# Patient Record
Sex: FEMALE | Race: WHITE | NOT HISPANIC OR LATINO | URBAN - METROPOLITAN AREA
[De-identification: names, ages, dates, MRNs, and addresses within clinical notes are randomized per-mention and may not be internally consistent; named-entity substitution may affect disease eponyms.]

---

## 2017-11-13 ENCOUNTER — OFFICE VISIT - HEALTHEAST (OUTPATIENT)
Dept: UROLOGY | Facility: CLINIC | Age: 47
End: 2017-11-13

## 2017-11-13 DIAGNOSIS — N20.9 URINARY TRACT STONES: ICD-10-CM

## 2017-11-13 DIAGNOSIS — N20.1 CALCULUS OF URETER: ICD-10-CM

## 2017-11-13 RX ORDER — RIZATRIPTAN BENZOATE 10 MG/1
10 TABLET ORAL
Status: SHIPPED | COMMUNITY
Start: 2017-11-13

## 2017-11-16 ENCOUNTER — HOSPITAL ENCOUNTER (OUTPATIENT)
Dept: ADMINISTRATIVE | Age: 47
Discharge: HOME | End: 2017-11-16

## 2017-11-20 ENCOUNTER — AMBULATORY - HEALTHEAST (OUTPATIENT)
Dept: LAB | Facility: HOSPITAL | Age: 47
End: 2017-11-20

## 2017-11-20 ENCOUNTER — COMMUNICATION - HEALTHEAST (OUTPATIENT)
Dept: UROLOGY | Facility: CLINIC | Age: 47
End: 2017-11-20

## 2017-11-20 DIAGNOSIS — N20.9 URINARY TRACT STONES: ICD-10-CM

## 2017-11-21 LAB
1ST CONSTITUENT:: NORMAL
SOURCE: NORMAL

## 2017-11-27 ENCOUNTER — OFFICE VISIT - HEALTHEAST (OUTPATIENT)
Dept: UROLOGY | Facility: CLINIC | Age: 47
End: 2017-11-27

## 2017-11-27 DIAGNOSIS — N20.9 URINARY TRACT STONES: ICD-10-CM

## 2017-11-27 DIAGNOSIS — N20.1 CALCULUS OF URETER: ICD-10-CM

## 2017-11-27 RX ORDER — MAGNESIUM OXIDE 400 MG/1
400 TABLET ORAL DAILY
Status: SHIPPED | COMMUNITY
Start: 2017-11-27

## 2021-06-14 NOTE — PROGRESS NOTES
Assessment/Plan:        Diagnoses and all orders for this visit:    Calculus of ureter  -     Patient Stated Goal: Pass my stone    Urinary tract stones  -     Urinalysis Macroscopic    Other orders  -     rizatriptan (MAXALT) 10 MG tablet; Take 10 mg by mouth.      Stone Management Plan  [facility] 11/13/2017   Urinary Tract Infection No suspicion of infection   Renal Colic Well controlled symptoms   Renal Failure No suspicion of renal failure   Current CT date 11/10/2017   Right sided stones? Yes   R Number of ureteral stones 1   R GSD of ureteral stones 2   R Location of ureteral stone Distal   R Number of kidney stones  No renal stones   R Hydronephrosis Mild   R Stone Event New event   Diagnosis date 11/10/2017   Initial location of primary symptomatic stone Distal   Initial GSD of primary symptomatic stone 2   R MET status Initiation   R Current Plan MET   MET 2 week F/U   Left sided stones? No   L Stone Event No current event             Subjective:      HPI  Ms. Shena Mondragon is a 47 y.o.  female presenting to the [facility] [place] for a new problem.    She is a remotely recurrent unidentified composition stone former who has not required stone clearance procedures. She has not previously participated in stone risk evaluation. She has no identified modifiable stone risk factors. She has no identified non-modifiable stone risk factors.    Primary symptom at presentation was sudden onset  right flank pain Significant associated symptoms at presentation included:  nausea, vomiting, urinary frequency and dysuria. Significant current symptoms include:  right flank pain, urinary frequency and dysuria.. Pertinent negative current symptoms include:  fever and chills. She presented to ED where diagnosis and initial care was provided. She and her  have recently moved to the [facility] from [place] for his company. They speak limited English.    CT scan is personally reviewed  and demonstrates a 2 mm right extreme UVJ stone with mild hydronephrosis..    Significant labs from presentation include no hematuria, mild pyuria, normal WBC, normal creatinine and normal potassium.    PLAN  Will proceed with medical expulsive therapy. Risks and benefits were detailed of medical expulsive therapy including probability of stone passage, recurrent renal colic, and requirement of emergency medical and/or surgical care and further imaging. Patient verbalized understanding. Patient agrees with plan as discussed. She will return in 2 weeks without low dose CT scan.    Over the counter symptom control medications of ibuprofen and Dramamine were recommended.      ROS   Review of Systems  A 12 point comprehensive review of systems is negative except for HPI    Past Medical History:   Diagnosis Date     Kidney stone      Migraine        Past Surgical History:   Procedure Laterality Date      SECTION, CLASSIC       VEIN SURGERY         Current Outpatient Prescriptions   Medication Sig Dispense Refill     ibuprofen (ADVIL,MOTRIN) 200 MG tablet Take 3 tablets (600 mg total) by mouth every 6 (six) hours as needed for pain. 30 tablet 0     ondansetron (ZOFRAN ODT) 4 MG disintegrating tablet Take 1 tablet (4 mg total) by mouth every 8 (eight) hours as needed for nausea. 20 tablet 0     oxyCODONE-acetaminophen (PERCOCET) 5-325 mg per tablet Take 1-2 tablets by mouth every 6 (six) hours as needed for pain. 20 tablet 0     rizatriptan (MAXALT) 10 MG tablet Take 10 mg by mouth.       No current facility-administered medications for this visit.        No Known Allergies    Social History     Social History     Marital status:      Spouse name: N/A     Number of children: N/A     Years of education: N/A     Occupational History     Retired      Social History Main Topics     Smoking status: Never Smoker     Smokeless tobacco: Never Used     Alcohol use Yes      Comment: occas     Drug use: Not on file      Sexual activity: Not on file     Other Topics Concern     Not on file     Social History Narrative     No narrative on file       Family History   Problem Relation Age of Onset     Urolithiasis Mother      Heart disease Mother      Diabetes Mother      Diabetes Maternal Grandmother      Clotting disorder Neg Hx      Gout Neg Hx      Cancer Neg Hx        Objective:      Physical Exam  Vitals:    11/13/17 1303   BP: 132/59   Pulse: 69   Temp: 98.1  F (36.7  C)     General - well developed, well nourished, appropriate for age. Appears fatigued and moderately uncomfortable   Heart - regular rate and rhythm, no murmur  Respiratory - normal effort, clear to auscultation, good air entry without adventitious noises  Abdomen - slender soft, non-tender, no hepatosplenomegaly, no masses.   - right flank mild tenderness, no suprapubic tenderness, kidney and bladder non-palpable  MSK - normal spinal curvature. no spinal tenderness. normal gait. muscular strength intact.  Neurology - cranial nerves II-XII grossly intact, normal sensation, no unsteadiness  Skin - intact, no bruising, no gouty tophi  Psych - oriented to time, place, and person, normal mood and affect.        Labs  Urinalysis POC (Office):  Nitrite, UA   Date Value Ref Range Status   11/13/2017 Negative Negative Final   11/10/2017 Negative Negative Final       Lab Urinalysis:  Blood, UA   Date Value Ref Range Status   11/13/2017 Trace (!) Negative Final   11/10/2017 Negative Negative Final     Nitrite, UA   Date Value Ref Range Status   11/13/2017 Negative Negative Final   11/10/2017 Negative Negative Final     Leukocytes, UA   Date Value Ref Range Status   11/13/2017 Negative Negative Final   11/10/2017 Trace (!) Negative Final     pH, UA   Date Value Ref Range Status   11/13/2017 7.0 5.0 - 8.0 Final   11/10/2017 8.0 4.5 - 8.0 Final    and Acute Labs   CBC   WBC   Date Value Ref Range Status   11/10/2017 7.2 4.0 - 11.0 thou/uL Final     Hemoglobin   Date Value  Ref Range Status   11/10/2017 13.0 12.0 - 16.0 g/dL Final     Platelets   Date Value Ref Range Status   11/10/2017 234 140 - 440 thou/uL Final   , C Reactive Protein  No results found for: CRP, Renal Panel  [facility]  Creatinine   Date Value Ref Range Status   11/10/2017 0.88 0.60 - 1.10 mg/dL Final     Potassium   Date Value Ref Range Status   11/10/2017 4.2 3.5 - 5.0 mmol/L Final     Calcium   Date Value Ref Range Status   11/10/2017 8.9 8.5 - 10.5 mg/dL Final    and Urine Culture    Culture   Date Value Ref Range Status   11/10/2017 No Growth  Final

## 2021-06-14 NOTE — PROGRESS NOTES
Assessment/Plan:        Diagnoses and all orders for this visit:    Calculus of ureter  -     Calcium Oxalate Stone Prevention Education    Urinary tract stones  -     Urinalysis Macroscopic    Other orders  -     calcium carbonate-vitamin D2 500 mg(1,250mg) -200 unit tablet; Take 1 tablet by mouth 2 (two) times a day.  -     cholecalciferol, vitamin D3, 1,000 unit tablet; Take 1,000 Units by mouth daily.  -     magnesium oxide (MAG-OX) 400 mg tablet; Take 400 mg by mouth daily.      Stone Management Plan  [facility] 11/13/2017 11/27/2017   Urinary Tract Infection No suspicion of infection No suspicion of infection   Renal Colic Well controlled symptoms Asymptomatic at this time   Renal Failure No suspicion of renal failure No suspicion of renal failure   Current CT date 11/10/2017 -   Right sided stones? Yes -   R Number of ureteral stones 1 -   R GSD of ureteral stones 2 -   R Location of ureteral stone Distal -   R Number of kidney stones  No renal stones -   R Hydronephrosis Mild -   R Stone Event New event Resolved event   Diagnosis date 11/10/2017 -   Initial location of primary symptomatic stone Distal -   Initial GSD of primary symptomatic stone 2 -   Resolved date - 11/16/2017   R MET status Initiation Passed   R Current Plan MET -   MET 2 week F/U -   Left sided stones? No -   L Stone Event No current event No current event         Subjective:      HPI  Ms. Shena Mondragon is a 47 y.o. [place] female returning to the [facility] [place] for medical expulsive therapy follow up.     On last encounter, her 2 mm stone was in right distal ureter with mild hydronephrosis. She has had no unanticipated events.    She passed and retrieved a stone in the interval. She is asymptomatic at present and would like to discuss stone results. She denies symptoms of fever, chills, flank pain, nausea, vomiting, urinary frequency and dysuria.     Imaging was not performed today because she has passed  stone and captured for analysis. No additional stones on prior imaging.    PLAN    48 yo [place] speaking F with remote history of stone disease. Interval passage of right urolithiasis, asymptomatic.    She is congratulated on passing her stone and will not proceed with stone risk evaluation. We discussed conservative dietary recommendations for future stone prevention. She is happy to follow up on a prn basis.    Patient also seen and examined by TREY King   Review of systems is negative except for HPI.    Past Medical History:   Diagnosis Date     Kidney stone      Migraine        Past Surgical History:   Procedure Laterality Date      SECTION, CLASSIC       VEIN SURGERY         Current Outpatient Prescriptions   Medication Sig Dispense Refill     calcium carbonate-vitamin D2 500 mg(1,250mg) -200 unit tablet Take 1 tablet by mouth 2 (two) times a day.       cholecalciferol, vitamin D3, 1,000 unit tablet Take 1,000 Units by mouth daily.       magnesium oxide (MAG-OX) 400 mg tablet Take 400 mg by mouth daily.       rizatriptan (MAXALT) 10 MG tablet Take 10 mg by mouth.       No current facility-administered medications for this visit.        No Known Allergies    Social History     Social History     Marital status:      Spouse name: N/A     Number of children: N/A     Years of education: N/A     Occupational History     Retired      Social History Main Topics     Smoking status: Never Smoker     Smokeless tobacco: Never Used     Alcohol use Yes      Comment: occas     Drug use: Not on file     Sexual activity: Not on file     Other Topics Concern     Not on file     Social History Narrative       Family History   Problem Relation Age of Onset     Urolithiasis Mother      Heart disease Mother      Diabetes Mother      Diabetes Maternal Grandmother      Clotting disorder Neg Hx      Gout Neg Hx      Cancer Neg Hx      Objective:      Physical Exam  Vitals:    17 1042   BP: 121/61    Pulse: 74   Temp: 97.8  F (36.6  C)     General - well developed, well nourished, appropriate for age. Appears no distress at this time  Abdomen - slender soft, non-tender, no hepatosplenomegaly, no masses.   - no flank tenderness, no suprapubic tenderness, kidney and bladder non-palpable  MSK - normal spinal curvature. no spinal tenderness. normal gait. muscular strength intact.  Psych - oriented to time, place, and person, normal mood and affect.        Labs   Urinalysis POC (Office):  Nitrite, UA   Date Value Ref Range Status   11/27/2017 Negative Negative Final   11/13/2017 Negative Negative Final   11/10/2017 Negative Negative Final       Lab Urinalysis:  Blood, UA   Date Value Ref Range Status   11/27/2017 Negative Negative Final   11/13/2017 Trace (!) Negative Final   11/10/2017 Negative Negative Final     Nitrite, UA   Date Value Ref Range Status   11/27/2017 Negative Negative Final   11/13/2017 Negative Negative Final   11/10/2017 Negative Negative Final     Leukocytes, UA   Date Value Ref Range Status   11/27/2017 Negative Negative Final   11/13/2017 Negative Negative Final   11/10/2017 Trace (!) Negative Final     pH, UA   Date Value Ref Range Status   11/27/2017 6.0 5.0 - 8.0 Final   11/13/2017 7.0 5.0 - 8.0 Final   11/10/2017 8.0 4.5 - 8.0 Final

## 2021-06-16 PROBLEM — N20.9 URINARY TRACT STONES: Status: ACTIVE | Noted: 2017-11-27
